# Patient Record
Sex: MALE | Race: WHITE
[De-identification: names, ages, dates, MRNs, and addresses within clinical notes are randomized per-mention and may not be internally consistent; named-entity substitution may affect disease eponyms.]

---

## 2017-03-29 ENCOUNTER — HOSPITAL ENCOUNTER (OUTPATIENT)
Dept: HOSPITAL 53 - M OPP | Age: 52
Discharge: HOME | End: 2017-03-29
Attending: SURGERY
Payer: COMMERCIAL

## 2017-03-29 VITALS — BODY MASS INDEX: 38.51 KG/M2 | HEIGHT: 70 IN | WEIGHT: 269 LBS

## 2017-03-29 VITALS — SYSTOLIC BLOOD PRESSURE: 144 MMHG | DIASTOLIC BLOOD PRESSURE: 101 MMHG

## 2017-03-29 DIAGNOSIS — R06.83: ICD-10-CM

## 2017-03-29 DIAGNOSIS — Q25.1: ICD-10-CM

## 2017-03-29 DIAGNOSIS — D12.0: ICD-10-CM

## 2017-03-29 DIAGNOSIS — Z79.899: ICD-10-CM

## 2017-03-29 DIAGNOSIS — Z12.11: Primary | ICD-10-CM

## 2017-03-29 DIAGNOSIS — I10: ICD-10-CM

## 2017-03-29 DIAGNOSIS — E78.00: ICD-10-CM

## 2017-03-29 DIAGNOSIS — D12.1: ICD-10-CM

## 2017-03-29 DIAGNOSIS — F17.200: ICD-10-CM

## 2017-03-29 NOTE — ROOR
________________________________________________________________________________

Patient Name: Ac Abbasi        Procedure Date: 3/29/2017 11:59 AM

MRN: [id number]                          Account Number: [id number]

YOB: 1965                Age: 51

Room: [facility]                            Gender: Male

Note Status: Finalized                 

________________________________________________________________________________

 

Procedure:           Colonoscopy

Indications:         Screening for colorectal malignant neoplasm

Providers:           Elijah Demarco MD

Referring MD:        ELAINA MCMANUS DO

Requesting Provider: 

Medicines:           Monitored Anesthesia Care

Complications:       No immediate complications.

________________________________________________________________________________

Procedure:           Pre-Anesthesia Assessment:

                     - Prior to the procedure, a History and Physical was 

                     performed, and patient medications and allergies were 

                     reviewed. The patient is competent. The risks and 

                     benefits of the procedure and the sedation options and 

                     risks were discussed with the patient. All questions were 

                     answered and informed consent was obtained. Patient 

                     identification and proposed procedure were verified by 

                     the physician, the nurse and the anesthesiologist in the 

                     endoscopy suite. Mental Status Examination: alert and 

                     oriented. Airway Examination: normal oropharyngeal airway 

                     and neck mobility. Respiratory Examination: clear to 

                     auscultation. CV Examination: normal. Prophylactic 

                     Antibiotics: The patient does not require prophylactic 

                     antibiotics. Prior Anticoagulants: The patient has taken 

                     no previous anticoagulant or antiplatelet agents. ASA 

                     Grade Assessment: II - A patient with mild systemic 

                     disease. After reviewing the risks and benefits, the 

                     patient was deemed in satisfactory condition to undergo 

                     the procedure. The anesthesia plan was to use monitored 

                     anesthesia care (MAC). Immediately prior to 

                     administration of medications, the patient was 

                     re-assessed for adequacy to receive sedatives. The heart 

                     rate, respiratory rate, oxygen saturations, blood 

                     pressure, adequacy of pulmonary ventilation, and response 

                     to care were monitored throughout the procedure. The 

                     physical status of the patient was re-assessed after the 

                     procedure.

                     The Colonoscope was introduced through the anus and 

                     advanced to the cecum, identified by appendiceal orifice 

                     and ileocecal valve. The colonoscopy was performed 

                     without difficulty. The patient tolerated the procedure 

                     well.

                                                                                

Findings:

     The perianal examination was normal.

     A small polyp was found in the appendiceal orifice. The polyp was 

     sessile. The polyp was removed with a jumbo cold forceps. Resection and 

     retrieval were complete. Estimated blood loss was minimal.

     A 7 mm polyp was found in the cecum. The polyp was sessile. The polyp was 

     removed with a hot snare. Resection and retrieval were complete. 

     Estimated blood loss was minimal.

     A 7 to 8 mm polyp was found in the cecum. The polyp was sessile. The 

     polyp was removed with a hot snare. Resection and retrieval were 

     complete. Estimated blood loss was minimal.

     No additional abnormalities were found on retroflexion.

                                                                                

Impression:          - One small polyp at the appendiceal orifice, removed 

                     with a jumbo cold forceps. Resected and retrieved.

                     - One 7 mm polyp in the cecum, removed with a hot snare. 

                     Resected and retrieved.

                     - One 7 to 8 mm polyp in the cecum, removed with a hot 

                     snare. Resected and retrieved.

Recommendation:      - Discharge patient to home (ambulatory).

                     - Repeat colonoscopy in 3 years for surveillance.

                     - Telephone my office for pathology results in 2 weeks.

                                                                                

 

Elijah Demarco MD

_______________________

Elijah Demarco MD

3/29/2017 12:39:35 PM

This report has been signed electronically.

Number of Addenda: 0

 

Note Initiated On: 3/29/2017 11:59 AM

Estimated Blood Loss:

     Estimated blood loss was minimal.

## 2018-04-03 ENCOUNTER — HOSPITAL ENCOUNTER (OUTPATIENT)
Dept: HOSPITAL 53 - M SFHCCAPE | Age: 53
End: 2018-04-03
Attending: PHYSICIAN ASSISTANT
Payer: COMMERCIAL

## 2018-04-03 DIAGNOSIS — I10: Primary | ICD-10-CM

## 2018-04-03 DIAGNOSIS — Z12.5: ICD-10-CM

## 2018-04-03 DIAGNOSIS — E55.9: ICD-10-CM

## 2018-04-03 DIAGNOSIS — E78.5: ICD-10-CM

## 2018-04-03 DIAGNOSIS — R73.01: ICD-10-CM

## 2018-04-03 LAB
25(OH)D3 SERPL-MCNC: 11.5 NG/ML (ref 30–100)
ALBUMIN/GLOBULIN RATIO: 1.28 (ref 1–1.93)
ALBUMIN: 4.1 GM/DL (ref 3.2–5.2)
ALKALINE PHOSPHATASE: 71 U/L (ref 45–117)
ALT SERPL W P-5'-P-CCNC: 38 U/L (ref 12–78)
ANION GAP: 6 MEQ/L (ref 8–16)
AST SERPL-CCNC: 18 U/L (ref 7–37)
BASO #: 0.1 10^3/UL (ref 0–0.2)
BASO %: 0.8 % (ref 0–1)
BILIRUBIN,TOTAL: 0.5 MG/DL (ref 0.2–1)
BLOOD UREA NITROGEN: 16 MG/DL (ref 7–18)
CALCIUM LEVEL: 10.2 MG/DL (ref 8.5–10.1)
CARBON DIOXIDE LEVEL: 26 MEQ/L (ref 21–32)
CHLORIDE LEVEL: 108 MEQ/L (ref 98–107)
CHOLEST SERPL-MCNC: 225 MG/DL (ref ?–200)
CHOLESTEROL RISK RATIO: 6.43 (ref ?–5)
CREATININE FOR GFR: 0.87 MG/DL (ref 0.7–1.3)
EOS #: 0.2 10^3/UL (ref 0–0.5)
EOSINOPHIL NFR BLD AUTO: 3.4 % (ref 0–3)
EST. AVERAGE GLUCOSE BLD GHB EST-MCNC: 117 MG/DL (ref 60–110)
GFR SERPL CREATININE-BSD FRML MDRD: > 60 ML/MIN/{1.73_M2} (ref 56–?)
GLUCOSE, FASTING: 88 MG/DL (ref 70–100)
HDLC SERPL-MCNC: 35 MG/DL (ref 40–?)
HEMATOCRIT: 49.4 % (ref 42–52)
HEMOGLOBIN: 16.4 G/DL (ref 13.5–17.5)
IMMATURE GRANULOCYTE %: 0.3 % (ref 0–3)
LDL CHOLESTEROL: 150 MG/DL (ref ?–100)
LYMPH #: 1.9 10^3/UL (ref 1.5–4.5)
LYMPH %: 30.4 % (ref 24–44)
MEAN CORPUSCULAR HEMOGLOBIN: 29.6 PG (ref 27–33)
MEAN CORPUSCULAR HGB CONC: 33.2 G/DL (ref 32–36.5)
MEAN CORPUSCULAR VOLUME: 89.2 FL (ref 80–96)
MONO #: 0.5 10^3/UL (ref 0–0.8)
MONO %: 7.2 % (ref 0–5)
NEUTROPHILS #: 3.6 10^3/UL (ref 1.8–7.7)
NEUTROPHILS %: 57.9 % (ref 36–66)
NONHDLC SERPL-MCNC: 190 MG/DL
NRBC BLD AUTO-RTO: 0 % (ref 0–0)
PLATELET COUNT, AUTOMATED: 231 10^3/UL (ref 150–450)
POTASSIUM SERUM: 4.4 MEQ/L (ref 3.5–5.1)
PSA SERPL-MCNC: 1.99 NG/ML (ref ?–4)
RED BLOOD COUNT: 5.54 10^6/UL (ref 4.3–6.1)
RED CELL DISTRIBUTION WIDTH: 13.5 % (ref 11.5–14.5)
SODIUM LEVEL: 140 MEQ/L (ref 136–145)
TOTAL PROTEIN: 7.3 GM/DL (ref 6.4–8.2)
TRIGLYCERIDES LEVEL: 200 MG/DL (ref ?–150)
WHITE BLOOD COUNT: 6.2 10^3/UL (ref 4–10)

## 2018-05-01 ENCOUNTER — HOSPITAL ENCOUNTER (OUTPATIENT)
Dept: HOSPITAL 53 - M SFHCCAPE | Age: 53
End: 2018-05-01
Attending: PHYSICIAN ASSISTANT
Payer: COMMERCIAL

## 2018-05-01 DIAGNOSIS — E83.52: Primary | ICD-10-CM

## 2018-05-01 LAB
IONIZED CALCIUM: 5.2 MG/DL (ref 4.5–5.3)
PTH-INTACT SERPL-MCNC: 182.7 PG/ML (ref 18.5–88)

## 2018-05-24 ENCOUNTER — HOSPITAL ENCOUNTER (OUTPATIENT)
Dept: HOSPITAL 53 - M SFHCCAPE | Age: 53
End: 2018-05-24
Attending: PHYSICIAN ASSISTANT
Payer: COMMERCIAL

## 2018-05-24 DIAGNOSIS — E34.9: Primary | ICD-10-CM

## 2018-05-24 LAB
25(OH)D3 SERPL-MCNC: 18.5 NG/ML (ref 30–100)
ALBUMIN/GLOBULIN RATIO: 1.31 (ref 1–1.93)
ALBUMIN: 3.8 GM/DL (ref 3.2–5.2)
ALKALINE PHOSPHATASE: 68 U/L (ref 45–117)
ALT SERPL W P-5'-P-CCNC: 44 U/L (ref 12–78)
ANION GAP: 7 MEQ/L (ref 8–16)
AST SERPL-CCNC: 19 U/L (ref 7–37)
BILIRUBIN,TOTAL: 0.6 MG/DL (ref 0.2–1)
BLOOD UREA NITROGEN: 13 MG/DL (ref 7–18)
CALCIUM LEVEL: 9.9 MG/DL (ref 8.5–10.1)
CARBON DIOXIDE LEVEL: 26 MEQ/L (ref 21–32)
CHLORIDE LEVEL: 108 MEQ/L (ref 98–107)
CREATININE FOR GFR: 0.8 MG/DL (ref 0.7–1.3)
GFR SERPL CREATININE-BSD FRML MDRD: > 60 ML/MIN/{1.73_M2} (ref 56–?)
GLUCOSE, FASTING: 100 MG/DL (ref 70–100)
IONIZED CALCIUM: 5.1 MG/DL (ref 4.5–5.3)
POTASSIUM SERUM: 4.2 MEQ/L (ref 3.5–5.1)
PTH-INTACT SERPL-MCNC: 172.5 PG/ML (ref 18.5–88)
SODIUM LEVEL: 141 MEQ/L (ref 136–145)
TOTAL PROTEIN: 6.7 GM/DL (ref 6.4–8.2)

## 2019-01-21 ENCOUNTER — HOSPITAL ENCOUNTER (OUTPATIENT)
Dept: HOSPITAL 53 - M WUC | Age: 54
End: 2019-01-21
Attending: PHYSICIAN ASSISTANT
Payer: COMMERCIAL

## 2019-01-21 DIAGNOSIS — R05: Primary | ICD-10-CM

## 2019-01-21 LAB
ALBUMIN SERPL BCG-MCNC: 3.7 GM/DL (ref 3.2–5.2)
ALT SERPL W P-5'-P-CCNC: 27 U/L (ref 12–78)
BASOPHILS # BLD AUTO: 0 10^3/UL (ref 0–0.2)
BASOPHILS NFR BLD AUTO: 0.2 % (ref 0–1)
BILIRUB SERPL-MCNC: 0.9 MG/DL (ref 0.2–1)
BUN SERPL-MCNC: 16 MG/DL (ref 7–18)
CALCIUM SERPL-MCNC: 11.2 MG/DL (ref 8.5–10.1)
CHLORIDE SERPL-SCNC: 95 MEQ/L (ref 98–107)
CO2 SERPL-SCNC: 26 MEQ/L (ref 21–32)
CREAT SERPL-MCNC: 1.04 MG/DL (ref 0.7–1.3)
EOSINOPHIL # BLD AUTO: 0 10^3/UL (ref 0–0.5)
EOSINOPHIL NFR BLD AUTO: 0 % (ref 0–3)
GFR SERPL CREATININE-BSD FRML MDRD: > 60 ML/MIN/{1.73_M2} (ref 56–?)
GLUCOSE SERPL-MCNC: 98 MG/DL (ref 70–100)
HCT VFR BLD AUTO: 46.3 % (ref 42–52)
HGB BLD-MCNC: 15.9 G/DL (ref 13.5–17.5)
LIPASE SERPL-CCNC: 57 U/L (ref 73–393)
LYMPHOCYTES # BLD AUTO: 1.4 10^3/UL (ref 1.5–4.5)
LYMPHOCYTES NFR BLD AUTO: 9.5 % (ref 24–44)
MCH RBC QN AUTO: 30.4 PG (ref 27–33)
MCHC RBC AUTO-ENTMCNC: 34.3 G/DL (ref 32–36.5)
MCV RBC AUTO: 88.5 FL (ref 80–96)
MONOCYTES # BLD AUTO: 1.4 10^3/UL (ref 0–0.8)
MONOCYTES NFR BLD AUTO: 9.9 % (ref 0–5)
NEUTROPHILS # BLD AUTO: 11.4 10^3/UL (ref 1.8–7.7)
NEUTROPHILS NFR BLD AUTO: 80 % (ref 36–66)
PLATELET # BLD AUTO: 234 10^3/UL (ref 150–450)
POTASSIUM SERPL-SCNC: 4.5 MEQ/L (ref 3.5–5.1)
PROT SERPL-MCNC: 7.6 GM/DL (ref 6.4–8.2)
RBC # BLD AUTO: 5.23 10^6/UL (ref 4.3–6.1)
SODIUM SERPL-SCNC: 129 MEQ/L (ref 136–145)
WBC # BLD AUTO: 14.3 10^3/UL (ref 4–10)

## 2019-01-21 NOTE — REP
Clinical:  Cough.

 

Technique:  PA and lateral.

 

Comparison:  None.

 

Findings:

Chronic-appearing interstitial changes are suggested.  Subtle opacity in the left

upper lobe and right lower lobe cannot be excluded.  No effusion.  No

pneumothorax.  Mediastinum and cardiac silhouette are normal.  Skeletal

structures are intact.

 

Impression:

Chronic-appearing changes along with the possibility of left upper lobe and right

lower lobe opacities.  No prior examinations are available for comparison and

chest CT with contrast is recommended for further investigation.

 

 

Electronically Signed by

Levi Monk MD 01/21/2019 06:21 P

## 2019-01-22 LAB
CHLAMYDIA DNA AMPLIFICATION: NEGATIVE
N GONORRHOEA RRNA SPEC QL NAA+PROBE: NEGATIVE

## 2019-03-14 ENCOUNTER — HOSPITAL ENCOUNTER (OUTPATIENT)
Dept: HOSPITAL 53 - M SFHCCAPE | Age: 54
End: 2019-03-14
Attending: PHYSICIAN ASSISTANT
Payer: MEDICAID

## 2019-03-14 DIAGNOSIS — I10: ICD-10-CM

## 2019-03-14 DIAGNOSIS — E34.9: Primary | ICD-10-CM

## 2019-03-14 DIAGNOSIS — E55.9: ICD-10-CM

## 2019-03-14 DIAGNOSIS — E78.5: ICD-10-CM

## 2019-03-14 DIAGNOSIS — R73.01: ICD-10-CM

## 2019-03-14 LAB
25(OH)D3 SERPL-MCNC: 12 NG/ML (ref 30–100)
ALBUMIN SERPL BCG-MCNC: 3.8 GM/DL (ref 3.2–5.2)
ALT SERPL W P-5'-P-CCNC: 31 U/L (ref 12–78)
BASOPHILS # BLD AUTO: 0.1 10^3/UL (ref 0–0.2)
BASOPHILS NFR BLD AUTO: 0.9 % (ref 0–1)
BILIRUB SERPL-MCNC: 0.4 MG/DL (ref 0.2–1)
BUN SERPL-MCNC: 16 MG/DL (ref 7–18)
CA-I BLD-MCNC: 5.4 MG/DL (ref 4.5–5.3)
CALCIUM SERPL-MCNC: 9.9 MG/DL (ref 8.5–10.1)
CHLORIDE SERPL-SCNC: 109 MEQ/L (ref 98–107)
CHOLEST SERPL-MCNC: 208 MG/DL (ref ?–200)
CHOLEST/HDLC SERPL: 6.3 {RATIO} (ref ?–5)
CO2 SERPL-SCNC: 28 MEQ/L (ref 21–32)
CREAT SERPL-MCNC: 0.72 MG/DL (ref 0.7–1.3)
EOSINOPHIL # BLD AUTO: 0.2 10^3/UL (ref 0–0.5)
EOSINOPHIL NFR BLD AUTO: 3.7 % (ref 0–3)
EST. AVERAGE GLUCOSE BLD GHB EST-MCNC: 120 MG/DL (ref 60–110)
GFR SERPL CREATININE-BSD FRML MDRD: > 60 ML/MIN/{1.73_M2} (ref 56–?)
GLUCOSE SERPL-MCNC: 94 MG/DL (ref 70–100)
HCT VFR BLD AUTO: 46.7 % (ref 42–52)
HDLC SERPL-MCNC: 33 MG/DL (ref 40–?)
HGB BLD-MCNC: 15.4 G/DL (ref 13.5–17.5)
LDLC SERPL CALC-MCNC: 150 MG/DL (ref ?–100)
LYMPHOCYTES # BLD AUTO: 1.8 10^3/UL (ref 1.5–4.5)
LYMPHOCYTES NFR BLD AUTO: 33.8 % (ref 24–44)
MCH RBC QN AUTO: 30 PG (ref 27–33)
MCHC RBC AUTO-ENTMCNC: 33 G/DL (ref 32–36.5)
MCV RBC AUTO: 91 FL (ref 80–96)
MONOCYTES # BLD AUTO: 0.5 10^3/UL (ref 0–0.8)
MONOCYTES NFR BLD AUTO: 8.6 % (ref 0–5)
NEUTROPHILS # BLD AUTO: 2.8 10^3/UL (ref 1.8–7.7)
NEUTROPHILS NFR BLD AUTO: 52.1 % (ref 36–66)
NONHDLC SERPL-MCNC: 175 MG/DL
PLATELET # BLD AUTO: 220 10^3/UL (ref 150–450)
POTASSIUM SERPL-SCNC: 4.5 MEQ/L (ref 3.5–5.1)
PROT SERPL-MCNC: 6.7 GM/DL (ref 6.4–8.2)
PTH-INTACT SERPL-MCNC: 127.2 PG/ML (ref 18.5–88)
RBC # BLD AUTO: 5.13 10^6/UL (ref 4.3–6.1)
SODIUM SERPL-SCNC: 142 MEQ/L (ref 136–145)
TRIGL SERPL-MCNC: 125 MG/DL (ref ?–150)
TSH SERPL DL<=0.005 MIU/L-ACNC: 1.48 UIU/ML (ref 0.36–3.74)
WBC # BLD AUTO: 5.4 10^3/UL (ref 4–10)

## 2019-04-11 ENCOUNTER — HOSPITAL ENCOUNTER (OUTPATIENT)
Dept: HOSPITAL 53 - M RAD | Age: 54
End: 2019-04-11
Attending: PHYSICIAN ASSISTANT
Payer: COMMERCIAL

## 2019-04-11 DIAGNOSIS — R91.8: Primary | ICD-10-CM

## 2019-04-11 PROCEDURE — 71260 CT THORAX DX C+: CPT

## 2019-04-12 NOTE — REP
Clinical:  Abnormal chest x-ray findings.

 

Technique:  Axial contrast enhanced images from the thoracic inlet to the upper

abdomen with coronal and sagittal re-formations using 100 ml Isovue 370

intravenous contrast material.

 

Correlation:  Chest x-ray dated 01/21/2019.

 

Findings:

Lung fields are essentially well-aerated and clear.  No acute consolidation,

significant nodule or mass lesion.  No pleural effusion.  No pneumothorax.

Tracheobronchial tree is patent.  The mediastinum demonstrates mild

atherosclerotic changes to the thoracic aorta and coronary arteries without

aortic aneurysm or cardiomegaly.  No pericardial effusion.  No significant

adenopathy.  Musculoskeletal structures are intact.

 

Limited upper abdomen demonstrates normal bilateral adrenal glands.

 

Impression:

1.  No acute mediastinal or pleuroparenchymal process appreciated.  Previously

identified opacities in the left upper lobe and right lower lobe likely

represented elements of atelectasis/pneumonia which have resolved.

 

 

Electronically Signed by

Levi Monk MD 04/12/2019 03:48 A

## 2019-06-19 ENCOUNTER — HOSPITAL ENCOUNTER (OUTPATIENT)
Dept: HOSPITAL 53 - M SFHCCAPE | Age: 54
End: 2019-06-19
Attending: PHYSICIAN ASSISTANT
Payer: MEDICAID

## 2019-06-19 DIAGNOSIS — R73.01: ICD-10-CM

## 2019-06-19 DIAGNOSIS — Z12.5: Primary | ICD-10-CM

## 2019-06-19 DIAGNOSIS — I10: ICD-10-CM

## 2019-06-19 LAB
ALBUMIN SERPL BCG-MCNC: 3.8 GM/DL (ref 3.2–5.2)
ALT SERPL W P-5'-P-CCNC: 46 U/L (ref 12–78)
BASOPHILS # BLD AUTO: 0 10^3/UL (ref 0–0.2)
BASOPHILS NFR BLD AUTO: 0.6 % (ref 0–1)
BILIRUB SERPL-MCNC: 0.3 MG/DL (ref 0.2–1)
BUN SERPL-MCNC: 15 MG/DL (ref 7–18)
CALCIUM SERPL-MCNC: 9.8 MG/DL (ref 8.5–10.1)
CHLORIDE SERPL-SCNC: 110 MEQ/L (ref 98–107)
CHOLEST SERPL-MCNC: 201 MG/DL (ref ?–200)
CHOLEST/HDLC SERPL: 5.58 {RATIO} (ref ?–5)
CO2 SERPL-SCNC: 24 MEQ/L (ref 21–32)
CREAT SERPL-MCNC: 0.91 MG/DL (ref 0.7–1.3)
EOSINOPHIL # BLD AUTO: 0.2 10^3/UL (ref 0–0.5)
EOSINOPHIL NFR BLD AUTO: 3.9 % (ref 0–3)
EST. AVERAGE GLUCOSE BLD GHB EST-MCNC: 128 MG/DL (ref 60–110)
GFR SERPL CREATININE-BSD FRML MDRD: > 60 ML/MIN/{1.73_M2} (ref 56–?)
GLUCOSE SERPL-MCNC: 95 MG/DL (ref 70–100)
HCT VFR BLD AUTO: 47.5 % (ref 42–52)
HDLC SERPL-MCNC: 36 MG/DL (ref 40–?)
HGB BLD-MCNC: 15.9 G/DL (ref 13.5–17.5)
LDLC SERPL CALC-MCNC: 128 MG/DL (ref ?–100)
LYMPHOCYTES # BLD AUTO: 1.9 10^3/UL (ref 1.5–4.5)
LYMPHOCYTES NFR BLD AUTO: 30.6 % (ref 24–44)
MCH RBC QN AUTO: 30 PG (ref 27–33)
MCHC RBC AUTO-ENTMCNC: 33.5 G/DL (ref 32–36.5)
MCV RBC AUTO: 89.6 FL (ref 80–96)
MONOCYTES # BLD AUTO: 0.5 10^3/UL (ref 0–0.8)
MONOCYTES NFR BLD AUTO: 8.6 % (ref 0–5)
NEUTROPHILS # BLD AUTO: 3.4 10^3/UL (ref 1.8–7.7)
NEUTROPHILS NFR BLD AUTO: 55.7 % (ref 36–66)
NONHDLC SERPL-MCNC: 165 MG/DL
PLATELET # BLD AUTO: 205 10^3/UL (ref 150–450)
POTASSIUM SERPL-SCNC: 4.4 MEQ/L (ref 3.5–5.1)
PROT SERPL-MCNC: 7 GM/DL (ref 6.4–8.2)
RBC # BLD AUTO: 5.3 10^6/UL (ref 4.3–6.1)
SODIUM SERPL-SCNC: 141 MEQ/L (ref 136–145)
TRIGL SERPL-MCNC: 183 MG/DL (ref ?–150)
WBC # BLD AUTO: 6.2 10^3/UL (ref 4–10)

## 2019-09-30 ENCOUNTER — HOSPITAL ENCOUNTER (OUTPATIENT)
Dept: HOSPITAL 53 - M SFHCCAPE | Age: 54
End: 2019-09-30
Attending: PHYSICIAN ASSISTANT
Payer: MEDICAID

## 2019-09-30 DIAGNOSIS — E78.5: ICD-10-CM

## 2019-09-30 DIAGNOSIS — E55.9: ICD-10-CM

## 2019-09-30 DIAGNOSIS — I10: Primary | ICD-10-CM

## 2019-09-30 DIAGNOSIS — R73.01: ICD-10-CM

## 2019-09-30 LAB
25(OH)D3 SERPL-MCNC: 20.6 NG/ML (ref 30–100)
ALBUMIN SERPL BCG-MCNC: 3.7 GM/DL (ref 3.2–5.2)
ALT SERPL W P-5'-P-CCNC: 30 U/L (ref 12–78)
BILIRUB SERPL-MCNC: 0.3 MG/DL (ref 0.2–1)
BUN SERPL-MCNC: 16 MG/DL (ref 7–18)
CALCIUM SERPL-MCNC: 10 MG/DL (ref 8.5–10.1)
CHLORIDE SERPL-SCNC: 109 MEQ/L (ref 98–107)
CHOLEST SERPL-MCNC: 209 MG/DL (ref ?–200)
CHOLEST/HDLC SERPL: 6.33 {RATIO} (ref ?–5)
CO2 SERPL-SCNC: 25 MEQ/L (ref 21–32)
CREAT SERPL-MCNC: 0.81 MG/DL (ref 0.7–1.3)
EST. AVERAGE GLUCOSE BLD GHB EST-MCNC: 111 MG/DL (ref 60–110)
GFR SERPL CREATININE-BSD FRML MDRD: > 60 ML/MIN/{1.73_M2} (ref 56–?)
GLUCOSE SERPL-MCNC: 78 MG/DL (ref 70–100)
HDLC SERPL-MCNC: 33 MG/DL (ref 40–?)
LDLC SERPL CALC-MCNC: 145 MG/DL (ref ?–100)
NONHDLC SERPL-MCNC: 176 MG/DL
POTASSIUM SERPL-SCNC: 4.3 MEQ/L (ref 3.5–5.1)
PROT SERPL-MCNC: 6.7 GM/DL (ref 6.4–8.2)
SODIUM SERPL-SCNC: 141 MEQ/L (ref 136–145)
TRIGL SERPL-MCNC: 153 MG/DL (ref ?–150)

## 2021-04-07 ENCOUNTER — HOSPITAL ENCOUNTER (OUTPATIENT)
Dept: HOSPITAL 53 - M SFHCCAPE | Age: 56
End: 2021-04-07
Attending: PHYSICIAN ASSISTANT
Payer: MEDICAID

## 2021-04-07 DIAGNOSIS — E21.3: Primary | ICD-10-CM

## 2021-04-07 DIAGNOSIS — E55.9: ICD-10-CM

## 2021-04-07 DIAGNOSIS — E78.5: ICD-10-CM

## 2021-04-07 DIAGNOSIS — R73.01: ICD-10-CM

## 2021-04-07 DIAGNOSIS — Z12.5: ICD-10-CM

## 2021-04-07 DIAGNOSIS — I10: ICD-10-CM

## 2021-04-07 LAB
25(OH)D3 SERPL-MCNC: 15.1 NG/ML (ref 30–100)
ALBUMIN SERPL BCG-MCNC: 4 GM/DL (ref 3.2–5.2)
ALT SERPL W P-5'-P-CCNC: 27 U/L (ref 12–78)
BASOPHILS # BLD AUTO: 0.1 10^3/UL (ref 0–0.2)
BASOPHILS NFR BLD AUTO: 0.9 % (ref 0–1)
BILIRUB SERPL-MCNC: 0.3 MG/DL (ref 0.2–1)
BUN SERPL-MCNC: 16 MG/DL (ref 7–18)
CALCIUM SERPL-MCNC: 10.6 MG/DL (ref 8.5–10.1)
CHLORIDE SERPL-SCNC: 108 MEQ/L (ref 98–107)
CHOLEST SERPL-MCNC: 229 MG/DL (ref ?–200)
CHOLEST/HDLC SERPL: 6.19 {RATIO} (ref ?–5)
CO2 SERPL-SCNC: 29 MEQ/L (ref 21–32)
CREAT SERPL-MCNC: 0.86 MG/DL (ref 0.7–1.3)
EOSINOPHIL # BLD AUTO: 0.2 10^3/UL (ref 0–0.5)
EOSINOPHIL NFR BLD AUTO: 3.8 % (ref 0–3)
EST. AVERAGE GLUCOSE BLD GHB EST-MCNC: 111 MG/DL (ref 60–110)
GFR SERPL CREATININE-BSD FRML MDRD: > 60 ML/MIN/{1.73_M2} (ref 56–?)
GLUCOSE SERPL-MCNC: 99 MG/DL (ref 70–100)
HCT VFR BLD AUTO: 49.2 % (ref 42–52)
HDLC SERPL-MCNC: 37 MG/DL (ref 40–?)
HGB BLD-MCNC: 16.3 G/DL (ref 13.5–17.5)
LDLC SERPL CALC-MCNC: 159 MG/DL (ref ?–100)
LYMPHOCYTES # BLD AUTO: 1.9 10^3/UL (ref 1.5–5)
LYMPHOCYTES NFR BLD AUTO: 32.8 % (ref 24–44)
MCH RBC QN AUTO: 29.8 PG (ref 27–33)
MCHC RBC AUTO-ENTMCNC: 33.1 G/DL (ref 32–36.5)
MCV RBC AUTO: 89.9 FL (ref 80–96)
MONOCYTES # BLD AUTO: 0.4 10^3/UL (ref 0–0.8)
MONOCYTES NFR BLD AUTO: 7.6 % (ref 2–8)
NEUTROPHILS # BLD AUTO: 3.2 10^3/UL (ref 1.5–8.5)
NEUTROPHILS NFR BLD AUTO: 54.6 % (ref 36–66)
NONHDLC SERPL-MCNC: 192 MG/DL
PHOSPHATE SERPL-MCNC: 2 MG/DL (ref 2.5–4.9)
PLATELET # BLD AUTO: 203 10^3/UL (ref 150–450)
POTASSIUM SERPL-SCNC: 4.7 MEQ/L (ref 3.5–5.1)
PROT SERPL-MCNC: 6.7 GM/DL (ref 6.4–8.2)
PTH-INTACT SERPL-MCNC: 148.1 PG/ML (ref 18.5–88)
RBC # BLD AUTO: 5.47 10^6/UL (ref 4.3–6.1)
SODIUM SERPL-SCNC: 140 MEQ/L (ref 136–145)
TRIGL SERPL-MCNC: 165 MG/DL (ref ?–150)
TSH SERPL DL<=0.005 MIU/L-ACNC: 1.91 UIU/ML (ref 0.36–3.74)
WBC # BLD AUTO: 5.8 10^3/UL (ref 4–10)